# Patient Record
Sex: FEMALE | Race: BLACK OR AFRICAN AMERICAN | NOT HISPANIC OR LATINO | URBAN - METROPOLITAN AREA
[De-identification: names, ages, dates, MRNs, and addresses within clinical notes are randomized per-mention and may not be internally consistent; named-entity substitution may affect disease eponyms.]

---

## 2024-05-03 ENCOUNTER — INPATIENT (INPATIENT)
Facility: HOSPITAL | Age: 49
LOS: 1 days | Discharge: HOME | End: 2024-05-05
Attending: INTERNAL MEDICINE | Admitting: INTERNAL MEDICINE
Payer: COMMERCIAL

## 2024-05-03 ENCOUNTER — RESULT REVIEW (OUTPATIENT)
Age: 49
End: 2024-05-03

## 2024-05-03 VITALS
HEIGHT: 59 IN | DIASTOLIC BLOOD PRESSURE: 80 MMHG | OXYGEN SATURATION: 97 % | HEART RATE: 96 BPM | RESPIRATION RATE: 19 BRPM | WEIGHT: 248.9 LBS | TEMPERATURE: 98 F | SYSTOLIC BLOOD PRESSURE: 172 MMHG

## 2024-05-03 DIAGNOSIS — J45.901 UNSPECIFIED ASTHMA WITH (ACUTE) EXACERBATION: ICD-10-CM

## 2024-05-03 DIAGNOSIS — E66.01 MORBID (SEVERE) OBESITY DUE TO EXCESS CALORIES: ICD-10-CM

## 2024-05-03 DIAGNOSIS — R79.89 OTHER SPECIFIED ABNORMAL FINDINGS OF BLOOD CHEMISTRY: ICD-10-CM

## 2024-05-03 DIAGNOSIS — R03.0 ELEVATED BLOOD-PRESSURE READING, WITHOUT DIAGNOSIS OF HYPERTENSION: ICD-10-CM

## 2024-05-03 LAB
ALBUMIN SERPL ELPH-MCNC: 3.4 G/DL — SIGNIFICANT CHANGE UP (ref 3.3–5)
ALP SERPL-CCNC: 107 U/L — SIGNIFICANT CHANGE UP (ref 40–120)
ALT FLD-CCNC: 22 U/L — SIGNIFICANT CHANGE UP (ref 12–78)
ANION GAP SERPL CALC-SCNC: 5 MMOL/L — SIGNIFICANT CHANGE UP (ref 5–17)
APTT BLD: 30.3 SEC — SIGNIFICANT CHANGE UP (ref 24.5–35.6)
AST SERPL-CCNC: 31 U/L — SIGNIFICANT CHANGE UP (ref 15–37)
BASOPHILS # BLD AUTO: 0.05 K/UL — SIGNIFICANT CHANGE UP (ref 0–0.2)
BASOPHILS NFR BLD AUTO: 0.5 % — SIGNIFICANT CHANGE UP (ref 0–2)
BILIRUB SERPL-MCNC: 0.6 MG/DL — SIGNIFICANT CHANGE UP (ref 0.2–1.2)
BUN SERPL-MCNC: 8 MG/DL — SIGNIFICANT CHANGE UP (ref 7–23)
CALCIUM SERPL-MCNC: 9 MG/DL — SIGNIFICANT CHANGE UP (ref 8.5–10.1)
CHLORIDE SERPL-SCNC: 106 MMOL/L — SIGNIFICANT CHANGE UP (ref 96–108)
CK MB BLD-MCNC: <1.5 % — SIGNIFICANT CHANGE UP (ref 0–3.5)
CK MB CFR SERPL CALC: <1 NG/ML — SIGNIFICANT CHANGE UP (ref 0.5–3.6)
CK SERPL-CCNC: 65 U/L — SIGNIFICANT CHANGE UP (ref 26–192)
CO2 SERPL-SCNC: 27 MMOL/L — SIGNIFICANT CHANGE UP (ref 22–31)
CREAT SERPL-MCNC: 0.67 MG/DL — SIGNIFICANT CHANGE UP (ref 0.5–1.3)
EGFR: 106 ML/MIN/1.73M2 — SIGNIFICANT CHANGE UP
EOSINOPHIL # BLD AUTO: 0.79 K/UL — HIGH (ref 0–0.5)
EOSINOPHIL NFR BLD AUTO: 7.4 % — HIGH (ref 0–6)
FLUAV AG NPH QL: SIGNIFICANT CHANGE UP
FLUBV AG NPH QL: SIGNIFICANT CHANGE UP
GLUCOSE SERPL-MCNC: 111 MG/DL — HIGH (ref 70–99)
HCG SERPL-ACNC: <1 MIU/ML — SIGNIFICANT CHANGE UP
HCT VFR BLD CALC: 39.1 % — SIGNIFICANT CHANGE UP (ref 34.5–45)
HGB BLD-MCNC: 12.3 G/DL — SIGNIFICANT CHANGE UP (ref 11.5–15.5)
IMM GRANULOCYTES NFR BLD AUTO: 0.3 % — SIGNIFICANT CHANGE UP (ref 0–0.9)
INR BLD: 0.96 RATIO — SIGNIFICANT CHANGE UP (ref 0.85–1.18)
LYMPHOCYTES # BLD AUTO: 1.55 K/UL — SIGNIFICANT CHANGE UP (ref 1–3.3)
LYMPHOCYTES # BLD AUTO: 14.5 % — SIGNIFICANT CHANGE UP (ref 13–44)
MCHC RBC-ENTMCNC: 28.5 PG — SIGNIFICANT CHANGE UP (ref 27–34)
MCHC RBC-ENTMCNC: 31.5 G/DL — LOW (ref 32–36)
MCV RBC AUTO: 90.5 FL — SIGNIFICANT CHANGE UP (ref 80–100)
MONOCYTES # BLD AUTO: 0.62 K/UL — SIGNIFICANT CHANGE UP (ref 0–0.9)
MONOCYTES NFR BLD AUTO: 5.8 % — SIGNIFICANT CHANGE UP (ref 2–14)
NEUTROPHILS # BLD AUTO: 7.68 K/UL — HIGH (ref 1.8–7.4)
NEUTROPHILS NFR BLD AUTO: 71.5 % — SIGNIFICANT CHANGE UP (ref 43–77)
NRBC # BLD: 0 /100 WBCS — SIGNIFICANT CHANGE UP (ref 0–0)
NT-PROBNP SERPL-SCNC: 92 PG/ML — SIGNIFICANT CHANGE UP (ref 0–125)
PLATELET # BLD AUTO: 260 K/UL — SIGNIFICANT CHANGE UP (ref 150–400)
POTASSIUM SERPL-MCNC: 3.8 MMOL/L — SIGNIFICANT CHANGE UP (ref 3.5–5.3)
POTASSIUM SERPL-SCNC: 3.8 MMOL/L — SIGNIFICANT CHANGE UP (ref 3.5–5.3)
PROT SERPL-MCNC: 8.4 GM/DL — HIGH (ref 6–8.3)
PROTHROM AB SERPL-ACNC: 11.5 SEC — SIGNIFICANT CHANGE UP (ref 9.5–13)
RAPID RVP RESULT: SIGNIFICANT CHANGE UP
RBC # BLD: 4.32 M/UL — SIGNIFICANT CHANGE UP (ref 3.8–5.2)
RBC # FLD: 18.9 % — HIGH (ref 10.3–14.5)
SARS-COV-2 RNA SPEC QL NAA+PROBE: SIGNIFICANT CHANGE UP
SARS-COV-2 RNA SPEC QL NAA+PROBE: SIGNIFICANT CHANGE UP
SODIUM SERPL-SCNC: 138 MMOL/L — SIGNIFICANT CHANGE UP (ref 135–145)
TROPONIN I, HIGH SENSITIVITY RESULT: 58.9 NG/L — HIGH
TROPONIN I, HIGH SENSITIVITY RESULT: 64.7 NG/L — HIGH
WBC # BLD: 10.72 K/UL — HIGH (ref 3.8–10.5)
WBC # FLD AUTO: 10.72 K/UL — HIGH (ref 3.8–10.5)

## 2024-05-03 PROCEDURE — 71045 X-RAY EXAM CHEST 1 VIEW: CPT | Mod: 26

## 2024-05-03 PROCEDURE — 93010 ELECTROCARDIOGRAM REPORT: CPT

## 2024-05-03 PROCEDURE — 93306 TTE W/DOPPLER COMPLETE: CPT | Mod: 26

## 2024-05-03 PROCEDURE — 99222 1ST HOSP IP/OBS MODERATE 55: CPT

## 2024-05-03 PROCEDURE — 99285 EMERGENCY DEPT VISIT HI MDM: CPT

## 2024-05-03 RX ORDER — BUDESONIDE AND FORMOTEROL FUMARATE DIHYDRATE 160; 4.5 UG/1; UG/1
2 AEROSOL RESPIRATORY (INHALATION)
Refills: 0 | Status: DISCONTINUED | OUTPATIENT
Start: 2024-05-03 | End: 2024-05-05

## 2024-05-03 RX ORDER — ENOXAPARIN SODIUM 100 MG/ML
40 INJECTION SUBCUTANEOUS EVERY 12 HOURS
Refills: 0 | Status: DISCONTINUED | OUTPATIENT
Start: 2024-05-03 | End: 2024-05-05

## 2024-05-03 RX ORDER — AMLODIPINE BESYLATE 2.5 MG/1
10 TABLET ORAL DAILY
Refills: 0 | Status: DISCONTINUED | OUTPATIENT
Start: 2024-05-03 | End: 2024-05-05

## 2024-05-03 RX ORDER — LANOLIN ALCOHOL/MO/W.PET/CERES
3 CREAM (GRAM) TOPICAL AT BEDTIME
Refills: 0 | Status: DISCONTINUED | OUTPATIENT
Start: 2024-05-03 | End: 2024-05-05

## 2024-05-03 RX ORDER — ACETAMINOPHEN 500 MG
650 TABLET ORAL EVERY 6 HOURS
Refills: 0 | Status: DISCONTINUED | OUTPATIENT
Start: 2024-05-03 | End: 2024-05-05

## 2024-05-03 RX ORDER — IPRATROPIUM/ALBUTEROL SULFATE 18-103MCG
3 AEROSOL WITH ADAPTER (GRAM) INHALATION ONCE
Refills: 0 | Status: COMPLETED | OUTPATIENT
Start: 2024-05-03 | End: 2024-05-03

## 2024-05-03 RX ORDER — IPRATROPIUM/ALBUTEROL SULFATE 18-103MCG
3 AEROSOL WITH ADAPTER (GRAM) INHALATION EVERY 6 HOURS
Refills: 0 | Status: DISCONTINUED | OUTPATIENT
Start: 2024-05-03 | End: 2024-05-05

## 2024-05-03 RX ORDER — AMLODIPINE BESYLATE 2.5 MG/1
10 TABLET ORAL DAILY
Refills: 0 | Status: DISCONTINUED | OUTPATIENT
Start: 2024-05-03 | End: 2024-05-03

## 2024-05-03 RX ADMIN — AMLODIPINE BESYLATE 10 MILLIGRAM(S): 2.5 TABLET ORAL at 17:05

## 2024-05-03 RX ADMIN — Medication 3 MILLILITER(S): at 11:50

## 2024-05-03 RX ADMIN — Medication 3 MILLILITER(S): at 18:06

## 2024-05-03 RX ADMIN — Medication 3 MILLILITER(S): at 23:31

## 2024-05-03 RX ADMIN — BUDESONIDE AND FORMOTEROL FUMARATE DIHYDRATE 2 PUFF(S): 160; 4.5 AEROSOL RESPIRATORY (INHALATION) at 17:25

## 2024-05-03 RX ADMIN — ENOXAPARIN SODIUM 40 MILLIGRAM(S): 100 INJECTION SUBCUTANEOUS at 19:10

## 2024-05-03 NOTE — ED ADULT NURSE NOTE - OBJECTIVE STATEMENT
Pt BIB EMS from home c/o difficulty breathing x 1 week. Pt reports experiencing symptoms x 3 years and has been using friend's inhaler with reported relief. Pt endorses using inhaler up 3-4 pumps every morning after which she feels better. Pt has never been diagnosed with asthma but reports her mother had a hx of asthma. Per EMS pt received  decadron 10mg x1, 2 duonebs and mag 2grams x1 via 20 guage placed by EMS on top if L hand. Pt reports expereincing some relief. Pt speaking in clear complete sentences, equal rise and fall of chest wall noted.

## 2024-05-03 NOTE — ED PROVIDER NOTE - OBJECTIVE STATEMENT
48 y/o F no pmhx presents w/ sob for past 1 week. worse today per patient. per ems, patient wheezing and only able to speak in few words. was given decadron, 2x duo-nebs, magnesium and IM epi w/ significant improvement. patient states she was never diagnosed w/ asthma but believes that she has asthma, has family hx and has been using friends inhaler intermittently for past 3 years. denies smoking hx. denies chest pain. denies abdominal pain. endorsing cough. denies fever/chills. denies abdominal pain. denies trauma.

## 2024-05-03 NOTE — H&P ADULT - NSHPPHYSICALEXAM_GEN_ALL_CORE
CONSTITUTIONAL: alert and cooperative, no acute distress.   EYES: PERRL, no scleral icterus  ENT: Mucosa moist, tongue normal.  NECK: Neck supple, trachea midline, non-tender  CARDIAC: Normal S1 and S2. Regular rate and rhythms. No Pedal edema  LUNGS: Equal but slightly decreased air entry both lungs. Mild wheezing. Increase respiratory effort.   ABDOMEN: Soft, nondistended, nontender. No guarding or rebound tenderness. No hepatomegaly or splenomegaly. Bowel sound normal  MUSCULOSKELETAL: Normocephalic, atraumatic. No significant deformity or joint abnormality  NEUROLOGICAL: No gross motor or sensory deficits  PSYCHIATRIC: A&O x 3, appropriate mood and affect.

## 2024-05-03 NOTE — PATIENT PROFILE ADULT - STATED REASON FOR ADMISSION
Dr Tevin Desir checked patient over and determined patient can go home and follow up with care provider with scheduled appointment. SOB x1 week

## 2024-05-03 NOTE — H&P ADULT - PROBLEM SELECTOR PLAN 4
hypertensive to systolic 170s upon arrival, now 190s  Start amlodipine 10mg daily  Monitor BP and titrate BP meds

## 2024-05-03 NOTE — ED PROVIDER NOTE - CLINICAL SUMMARY MEDICAL DECISION MAKING FREE TEXT BOX
50 y/o F no pmhx presents w/ sob for past 1 week. worse today per patient. per ems, patient wheezing and only able to speak in few words. was given decadron, 2x duo-nebs, magnesium and IM epi w/ significant improvement. patient states she was never diagnosed w/ asthma but believes that she has asthma, has family hx and has been using friends inhaler intermittently for past 3 years. denies smoking hx. denies chest pain. denies abdominal pain. endorsing cough. denies fever/chills. denies abdominal pain. denies trauma.   diffuse exp wheezing. no accessory muscle use. patient appears significantly improved after EMS intervention. still continued wheezing. will give additional duo-neb. cxr, check labs. consider acute chf as well but less likely.   cardiac monitor, ekg.   likely acute asthma/reactive airway disease.

## 2024-05-03 NOTE — H&P ADULT - HISTORY OF PRESENT ILLNESS
49 years old female with h/o morbid obesity present to ED with complain of severe SOB and wheezing. Patient reported SOB for last 2 week or so, gradually worsened over last week. Patient reported intermittent SOB with wheezing for last 4-5 years. Has not seen PCP since COVID started. Patient was not evaluated by pul as well. For last 2 weeks, patient has been using her friend's inhalers. No fever, chills, nausea, vomiting or diarrhea  Slightly hypertensive, afebrile, sat well at RA. WBC 10.72, plt 260, K 3.8, Cr 0.67. hsTnT 58.9. Flu/COVID negative. EKG with NSR. CXR with no focal consolidation    SH: never smoke, use alcohol socially  FH: asthma, DM, HTN

## 2024-05-03 NOTE — H&P ADULT - PROBLEM SELECTOR PLAN 2
hsTnT 58.9  EKG  ( I personally review) with NSR  No chest pain or exertional chest pain  Likely due to asthma exacerbation and IM epi effect  Serial trop/Ck/CKMB, ECHO, telemetry  lipid panel, A1c hsTnT 58.9  EKG  ( I personally review) with NSR, no acute ST-T changes to suggest ACS  No chest pain or exertional chest pain  Likely due to asthma exacerbation and IM epi effect  Serial trop/Ck/CKMB, ECHO, telemetry  lipid panel, A1c

## 2024-05-03 NOTE — H&P ADULT - PROBLEM SELECTOR PLAN 1
present with severe SOB and wheezing  Received dexamethasone 10mg, duoneb, IV magnesium and IM Epi with EMS  CXR  ( I personally review) with no focal consolidation  Flu/COVID negative  Likely undiagnosed reactive airway disease with acute exacerbation since patient has intermittent flare up symptoms for last few years  Check RVP  Prednisone 40mg daily x 5 days, duoneb q6hr  start symbicort  Patient will need prescription for inhalers upon discharge  Patient will benefit from outpatient pul follow up for PFT

## 2024-05-03 NOTE — H&P ADULT - ASSESSMENT
49 years old female with h/o morbid obesity present to ED with complain of severe SOB and wheezing. Patient reported SOB for last 2 week or so, gradually worsened over last week. Patient reported intermittent SOB with wheezing for last 4-5 years. Has not seen PCP since COVID started. Patient was not evaluated by pul as well. For last 2 weeks, patient has been using her friend's inhalers. No fever, chills, nausea, vomiting or diarrhea  Slightly hypertensive, afebrile, sat well at RA. WBC 10.72, plt 260, K 3.8, Cr 0.67. hsTnT 58.9. Flu/COVID negative. EKG with NSR. CXR with no focal consolidation

## 2024-05-03 NOTE — ED ADULT TRIAGE NOTE - CHIEF COMPLAINT QUOTE
BIBA for difficulty breathing x1 week. No PMH. Received dex 10mg x1, 2 duonebs and mag 2grams x1 from EMS. G20 left hand.

## 2024-05-03 NOTE — ED PROVIDER NOTE - PHYSICAL EXAMINATION
General: Well appearing female in no acute distress  HEENT: Normocephalic, atraumatic. Moist mucous membranes. Oropharynx clear. No lymphadenopathy.  Eyes: No scleral icterus. EOMI. DOUGLAS.  Neck:. Soft and supple. Full ROM without pain. No midline tenderness  Cardiac: Regular rate and regular rhythm. No murmurs, rubs, gallops. Peripheral pulses 2+ and symmetric. No LE edema.  Resp: Lungs CTAB. Speaking in full sentences. +diffuse expiratory wheezing   Abd: Soft, non-tender, non-distended. No guarding or rebound. No scars, masses, or lesions.  Back: Spine midline and non-tender. No CVA tenderness.    Skin: No rashes, abrasions, or lacerations.  Neuro: AO x 3. Moves all extremities symmetrically. Motor strength and sensation grossly intact.

## 2024-05-04 LAB
A1C WITH ESTIMATED AVERAGE GLUCOSE RESULT: 5.5 % — SIGNIFICANT CHANGE UP (ref 4–5.6)
ALBUMIN SERPL ELPH-MCNC: 3 G/DL — LOW (ref 3.3–5)
ALP SERPL-CCNC: 103 U/L — SIGNIFICANT CHANGE UP (ref 40–120)
ALT FLD-CCNC: 16 U/L — SIGNIFICANT CHANGE UP (ref 12–78)
ANION GAP SERPL CALC-SCNC: 6 MMOL/L — SIGNIFICANT CHANGE UP (ref 5–17)
AST SERPL-CCNC: 14 U/L — LOW (ref 15–37)
BILIRUB SERPL-MCNC: 0.1 MG/DL — LOW (ref 0.2–1.2)
BUN SERPL-MCNC: 11 MG/DL — SIGNIFICANT CHANGE UP (ref 7–23)
CALCIUM SERPL-MCNC: 9.4 MG/DL — SIGNIFICANT CHANGE UP (ref 8.5–10.1)
CHLORIDE SERPL-SCNC: 108 MMOL/L — SIGNIFICANT CHANGE UP (ref 96–108)
CHOLEST SERPL-MCNC: 163 MG/DL — SIGNIFICANT CHANGE UP
CK MB BLD-MCNC: <2.2 % — SIGNIFICANT CHANGE UP (ref 0–3.5)
CK MB CFR SERPL CALC: <1 NG/ML — SIGNIFICANT CHANGE UP (ref 0.5–3.6)
CK SERPL-CCNC: 46 U/L — SIGNIFICANT CHANGE UP (ref 26–192)
CO2 SERPL-SCNC: 25 MMOL/L — SIGNIFICANT CHANGE UP (ref 22–31)
CREAT SERPL-MCNC: 0.6 MG/DL — SIGNIFICANT CHANGE UP (ref 0.5–1.3)
EGFR: 109 ML/MIN/1.73M2 — SIGNIFICANT CHANGE UP
ESTIMATED AVERAGE GLUCOSE: 111 MG/DL — SIGNIFICANT CHANGE UP (ref 68–114)
GLUCOSE SERPL-MCNC: 93 MG/DL — SIGNIFICANT CHANGE UP (ref 70–99)
HCT VFR BLD CALC: 34.6 % — SIGNIFICANT CHANGE UP (ref 34.5–45)
HCV AB S/CO SERPL IA: 0.11 S/CO — SIGNIFICANT CHANGE UP (ref 0–0.99)
HCV AB SERPL-IMP: SIGNIFICANT CHANGE UP
HDLC SERPL-MCNC: 60 MG/DL — SIGNIFICANT CHANGE UP
HGB BLD-MCNC: 11.1 G/DL — LOW (ref 11.5–15.5)
LIPID PNL WITH DIRECT LDL SERPL: 93 MG/DL — SIGNIFICANT CHANGE UP
MAGNESIUM SERPL-MCNC: 2.1 MG/DL — SIGNIFICANT CHANGE UP (ref 1.6–2.6)
MCHC RBC-ENTMCNC: 28.6 PG — SIGNIFICANT CHANGE UP (ref 27–34)
MCHC RBC-ENTMCNC: 32.1 G/DL — SIGNIFICANT CHANGE UP (ref 32–36)
MCV RBC AUTO: 89.2 FL — SIGNIFICANT CHANGE UP (ref 80–100)
NON HDL CHOLESTEROL: 103 MG/DL — SIGNIFICANT CHANGE UP
NRBC # BLD: 0 /100 WBCS — SIGNIFICANT CHANGE UP (ref 0–0)
PHOSPHATE SERPL-MCNC: 2.9 MG/DL — SIGNIFICANT CHANGE UP (ref 2.5–4.5)
PLATELET # BLD AUTO: 290 K/UL — SIGNIFICANT CHANGE UP (ref 150–400)
POTASSIUM SERPL-MCNC: 4.1 MMOL/L — SIGNIFICANT CHANGE UP (ref 3.5–5.3)
POTASSIUM SERPL-SCNC: 4.1 MMOL/L — SIGNIFICANT CHANGE UP (ref 3.5–5.3)
PROT SERPL-MCNC: 7.7 GM/DL — SIGNIFICANT CHANGE UP (ref 6–8.3)
RBC # BLD: 3.88 M/UL — SIGNIFICANT CHANGE UP (ref 3.8–5.2)
RBC # FLD: 18.8 % — HIGH (ref 10.3–14.5)
SODIUM SERPL-SCNC: 139 MMOL/L — SIGNIFICANT CHANGE UP (ref 135–145)
TRIGL SERPL-MCNC: 45 MG/DL — SIGNIFICANT CHANGE UP
TROPONIN I, HIGH SENSITIVITY RESULT: 48.5 NG/L — SIGNIFICANT CHANGE UP
WBC # BLD: 16.62 K/UL — HIGH (ref 3.8–10.5)
WBC # FLD AUTO: 16.62 K/UL — HIGH (ref 3.8–10.5)

## 2024-05-04 PROCEDURE — 99233 SBSQ HOSP IP/OBS HIGH 50: CPT

## 2024-05-04 RX ORDER — HYDRALAZINE HCL 50 MG
25 TABLET ORAL THREE TIMES A DAY
Refills: 0 | Status: DISCONTINUED | OUTPATIENT
Start: 2024-05-04 | End: 2024-05-05

## 2024-05-04 RX ORDER — FUROSEMIDE 40 MG
40 TABLET ORAL ONCE
Refills: 0 | Status: COMPLETED | OUTPATIENT
Start: 2024-05-04 | End: 2024-05-04

## 2024-05-04 RX ADMIN — Medication 40 MILLIGRAM(S): at 05:26

## 2024-05-04 RX ADMIN — Medication 3 MILLILITER(S): at 05:36

## 2024-05-04 RX ADMIN — Medication 40 MILLIGRAM(S): at 14:03

## 2024-05-04 RX ADMIN — Medication 3 MILLILITER(S): at 11:12

## 2024-05-04 RX ADMIN — Medication 3 MILLILITER(S): at 23:30

## 2024-05-04 RX ADMIN — Medication 3 MILLILITER(S): at 17:02

## 2024-05-04 RX ADMIN — AMLODIPINE BESYLATE 10 MILLIGRAM(S): 2.5 TABLET ORAL at 05:26

## 2024-05-04 RX ADMIN — Medication 25 MILLIGRAM(S): at 14:02

## 2024-05-04 RX ADMIN — BUDESONIDE AND FORMOTEROL FUMARATE DIHYDRATE 2 PUFF(S): 160; 4.5 AEROSOL RESPIRATORY (INHALATION) at 17:19

## 2024-05-04 RX ADMIN — Medication 25 MILLIGRAM(S): at 21:41

## 2024-05-04 RX ADMIN — BUDESONIDE AND FORMOTEROL FUMARATE DIHYDRATE 2 PUFF(S): 160; 4.5 AEROSOL RESPIRATORY (INHALATION) at 05:28

## 2024-05-04 RX ADMIN — ENOXAPARIN SODIUM 40 MILLIGRAM(S): 100 INJECTION SUBCUTANEOUS at 05:27

## 2024-05-04 RX ADMIN — ENOXAPARIN SODIUM 40 MILLIGRAM(S): 100 INJECTION SUBCUTANEOUS at 17:20

## 2024-05-04 NOTE — PROGRESS NOTE ADULT - TIME BILLING
I have spent a total of 51 minutes to prepare to see the patient, obtaining and reviewing history, physical examination, explaining the diagnosis, prognosis and treatment plan with the patient/family/caregiver. I also have spent the time ordering studies and testing, interpreting results, medicine reconciliation, subspecialty consultation and documentation as above.

## 2024-05-04 NOTE — PROGRESS NOTE ADULT - SUBJECTIVE AND OBJECTIVE BOX
Patient is a 49y old  Female who presents with a chief complaint of acute asthma exacerbation (03 May 2024 15:37)    INTERVAL HPI/OVERNIGHT EVENTS: NAEON     MEDICATIONS  (STANDING):  albuterol/ipratropium for Nebulization 3 milliLiter(s) Nebulizer every 6 hours  amLODIPine   Tablet 10 milliGRAM(s) Oral daily  budesonide 160 MICROgram(s)/formoterol 4.5 MICROgram(s) Inhaler 2 Puff(s) Inhalation two times a day  enoxaparin Injectable 40 milliGRAM(s) SubCutaneous every 12 hours  furosemide   Injectable 40 milliGRAM(s) IV Push once  predniSONE   Tablet 40 milliGRAM(s) Oral daily    MEDICATIONS  (PRN):  acetaminophen     Tablet .. 650 milliGRAM(s) Oral every 6 hours PRN Temp greater or equal to 38C (100.4F), Mild Pain (1 - 3), Moderate Pain (4 - 6)  melatonin 3 milliGRAM(s) Oral at bedtime PRN Insomnia    Allergies    No Known Allergies    Intolerances      REVIEW OF SYSTEMS:  All other systems reviewed and are negative    Vital Signs Last 24 Hrs  T(C): 37.1 (04 May 2024 10:36), Max: 37.1 (04 May 2024 10:36)  T(F): 98.8 (04 May 2024 10:36), Max: 98.8 (04 May 2024 10:36)  HR: 108 (04 May 2024 10:36) (80 - 108)  BP: 157/87 (04 May 2024 10:36) (147/80 - 193/98)  BP(mean): --  RR: 18 (04 May 2024 10:36) (16 - 18)  SpO2: 98% (04 May 2024 10:36) (97% - 98%)    Parameters below as of 04 May 2024 10:36  Patient On (Oxygen Delivery Method): room air      Daily     Daily Weight in k.1 (04 May 2024 04:59)  I&O's Summary    CAPILLARY BLOOD GLUCOSE        PHYSICAL EXAM:  CONSTITUTIONAL: alert and cooperative, no acute distress.   EYES: PERRL, no scleral icterus  ENT: Mucosa moist, tongue normal.  NECK: Neck supple, trachea midline, non-tender  CARDIAC: Normal S1 and S2. Regular rate and rhythms. No Pedal edema  LUNGS: Equal but slightly decreased air entry both lungs. Mild wheezing. Increase respiratory effort.   ABDOMEN: Soft, nondistended, nontender. No guarding or rebound tenderness. No hepatomegaly or splenomegaly. Bowel sound normal  MUSCULOSKELETAL: Normocephalic, atraumatic. No significant deformity or joint abnormality  NEUROLOGICAL: No gross motor or sensory deficits  PSYCHIATRIC: A&O x 3, appropriate mood and affect.    Labs                          11.1   16.62 )-----------( 290      ( 04 May 2024 05:55 )             34.6     05-04    139  |  108  |  11  ----------------------------<  93  4.1   |  25  |  0.60    Ca    9.4      04 May 2024 05:55  Phos  2.9     05-04  Mg     2.1     05-04    TPro  7.7  /  Alb  3.0<L>  /  TBili  0.1<L>  /  DBili  x   /  AST  14<L>  /  ALT  16  /  AlkPhos  103  05-04    PT/INR - ( 03 May 2024 11:38 )   PT: 11.5 sec;   INR: 0.96 ratio         PTT - ( 03 May 2024 11:38 )  PTT:30.3 sec  CARDIAC MARKERS ( 04 May 2024 05:55 )  x     / x     / 46 U/L / x     / <1.0 ng/mL  CARDIAC MARKERS ( 03 May 2024 15:25 )  x     / x     / 65 U/L / x     / <1.0 ng/mL      Urinalysis Basic - ( 04 May 2024 05:55 )    Color: x / Appearance: x / SG: x / pH: x  Gluc: 93 mg/dL / Ketone: x  / Bili: x / Urobili: x   Blood: x / Protein: x / Nitrite: x   Leuk Esterase: x / RBC: x / WBC x   Sq Epi: x / Non Sq Epi: x / Bacteria: x                  DVT prophylaxis: > Lovenox 40mg SQ daily  > Heparin   > SCD's

## 2024-05-04 NOTE — PROGRESS NOTE ADULT - ASSESSMENT
49 years old female with h/o morbid obesity present to ED with complain of severe SOB and wheezing. Patient reported SOB for last 2 week or so, gradually worsened over last week. Admitted for Acute asthma exacerbation.     ##Acute Asthma exacerbation    Patient reported SOB for last 2 week or so, gradually worsened over last week. Patient reported intermittent SOB with wheezing for last 4-5 years. Has not seen PCP since COVID started. Patient was not evaluated by pul as well. For last 2 weeks, patient has been using her friend's inhalers.  EKG with NSR. CXR with no focal consolidation Received dexamethasone 10mg, duoneb, IV magnesium and IM Epi with EMS. CXR reviewed no focal consolidation. RVP negative. Likely undiagnosed reactive airway disease with acute exacerbation since patient has intermittent flare up symptoms for last few years. Symptomatically improved and now on RA.   - C/w Prednisone 40mg daily x 5 days, duoneb q6hr  - C/w symbicort  - OT lasix for congestion   - O/p follow up with Pulm at discharge for PFTs    # Elevated troponin.   ·  Plan: hsTnT 58.9->64.7->48.5. EKG reviewed with NSR, no acute ST-T changes to suggest ACS. No chest pain.  or exertional chest pain. Likely due to asthma exacerbation and IM epi effect. Echo reviewed - Left ventricular ejection fraction, by visual estimation, is 65 to 70%, Normal global left ventricular systolic function. There is mild left ventricular hypertrophy. No wall motion abnormalities.     # Morbid obesity.   ·  BMI 48.3, weight loss and lifestyle modification.    # Elevated BP without diagnosis of hypertension.   · At admission, hypertensive to systolic 170s upon arrival, now 190s  C/w amlodipine 10mg daily  - Start hydralazine 25md TID     Diet: regular   DVT prophylaxis: lovenox  Dispo: Tele   Code Status:

## 2024-05-05 ENCOUNTER — TRANSCRIPTION ENCOUNTER (OUTPATIENT)
Age: 49
End: 2024-05-05

## 2024-05-05 VITALS — OXYGEN SATURATION: 99 %

## 2024-05-05 LAB
ANION GAP SERPL CALC-SCNC: 7 MMOL/L — SIGNIFICANT CHANGE UP (ref 5–17)
BASOPHILS # BLD AUTO: 0.04 K/UL — SIGNIFICANT CHANGE UP (ref 0–0.2)
BASOPHILS NFR BLD AUTO: 0.3 % — SIGNIFICANT CHANGE UP (ref 0–2)
BUN SERPL-MCNC: 18 MG/DL — SIGNIFICANT CHANGE UP (ref 7–23)
CALCIUM SERPL-MCNC: 9 MG/DL — SIGNIFICANT CHANGE UP (ref 8.5–10.1)
CHLORIDE SERPL-SCNC: 106 MMOL/L — SIGNIFICANT CHANGE UP (ref 96–108)
CO2 SERPL-SCNC: 25 MMOL/L — SIGNIFICANT CHANGE UP (ref 22–31)
CREAT SERPL-MCNC: 0.52 MG/DL — SIGNIFICANT CHANGE UP (ref 0.5–1.3)
EGFR: 112 ML/MIN/1.73M2 — SIGNIFICANT CHANGE UP
EOSINOPHIL # BLD AUTO: 0.13 K/UL — SIGNIFICANT CHANGE UP (ref 0–0.5)
EOSINOPHIL NFR BLD AUTO: 0.8 % — SIGNIFICANT CHANGE UP (ref 0–6)
GLUCOSE SERPL-MCNC: 88 MG/DL — SIGNIFICANT CHANGE UP (ref 70–99)
HCT VFR BLD CALC: 33.5 % — LOW (ref 34.5–45)
HGB BLD-MCNC: 10.6 G/DL — LOW (ref 11.5–15.5)
IMM GRANULOCYTES NFR BLD AUTO: 0.5 % — SIGNIFICANT CHANGE UP (ref 0–0.9)
LYMPHOCYTES # BLD AUTO: 13 % — SIGNIFICANT CHANGE UP (ref 13–44)
LYMPHOCYTES # BLD AUTO: 2.01 K/UL — SIGNIFICANT CHANGE UP (ref 1–3.3)
MAGNESIUM SERPL-MCNC: 1.9 MG/DL — SIGNIFICANT CHANGE UP (ref 1.6–2.6)
MCHC RBC-ENTMCNC: 28.2 PG — SIGNIFICANT CHANGE UP (ref 27–34)
MCHC RBC-ENTMCNC: 31.6 G/DL — LOW (ref 32–36)
MCV RBC AUTO: 89.1 FL — SIGNIFICANT CHANGE UP (ref 80–100)
MONOCYTES # BLD AUTO: 0.95 K/UL — HIGH (ref 0–0.9)
MONOCYTES NFR BLD AUTO: 6.2 % — SIGNIFICANT CHANGE UP (ref 2–14)
NEUTROPHILS # BLD AUTO: 12.21 K/UL — HIGH (ref 1.8–7.4)
NEUTROPHILS NFR BLD AUTO: 79.2 % — HIGH (ref 43–77)
NRBC # BLD: 0 /100 WBCS — SIGNIFICANT CHANGE UP (ref 0–0)
PLATELET # BLD AUTO: 261 K/UL — SIGNIFICANT CHANGE UP (ref 150–400)
POTASSIUM SERPL-MCNC: 3.5 MMOL/L — SIGNIFICANT CHANGE UP (ref 3.5–5.3)
POTASSIUM SERPL-SCNC: 3.5 MMOL/L — SIGNIFICANT CHANGE UP (ref 3.5–5.3)
RBC # BLD: 3.76 M/UL — LOW (ref 3.8–5.2)
RBC # FLD: 19.2 % — HIGH (ref 10.3–14.5)
SODIUM SERPL-SCNC: 138 MMOL/L — SIGNIFICANT CHANGE UP (ref 135–145)
WBC # BLD: 15.58 K/UL — HIGH (ref 3.8–10.5)
WBC # FLD AUTO: 15.58 K/UL — HIGH (ref 3.8–10.5)

## 2024-05-05 PROCEDURE — 99239 HOSP IP/OBS DSCHRG MGMT >30: CPT

## 2024-05-05 RX ORDER — AMLODIPINE BESYLATE 2.5 MG/1
1 TABLET ORAL
Qty: 30 | Refills: 3
Start: 2024-05-05 | End: 2024-09-01

## 2024-05-05 RX ORDER — BUDESONIDE AND FORMOTEROL FUMARATE DIHYDRATE 160; 4.5 UG/1; UG/1
2 AEROSOL RESPIRATORY (INHALATION)
Qty: 1 | Refills: 3
Start: 2024-05-05 | End: 2024-09-01

## 2024-05-05 RX ADMIN — ENOXAPARIN SODIUM 40 MILLIGRAM(S): 100 INJECTION SUBCUTANEOUS at 05:34

## 2024-05-05 RX ADMIN — Medication 25 MILLIGRAM(S): at 05:33

## 2024-05-05 RX ADMIN — ENOXAPARIN SODIUM 40 MILLIGRAM(S): 100 INJECTION SUBCUTANEOUS at 17:13

## 2024-05-05 RX ADMIN — Medication 25 MILLIGRAM(S): at 15:20

## 2024-05-05 RX ADMIN — Medication 40 MILLIGRAM(S): at 05:33

## 2024-05-05 RX ADMIN — BUDESONIDE AND FORMOTEROL FUMARATE DIHYDRATE 2 PUFF(S): 160; 4.5 AEROSOL RESPIRATORY (INHALATION) at 05:35

## 2024-05-05 RX ADMIN — AMLODIPINE BESYLATE 10 MILLIGRAM(S): 2.5 TABLET ORAL at 05:32

## 2024-05-05 RX ADMIN — Medication 3 MILLILITER(S): at 11:08

## 2024-05-05 RX ADMIN — BUDESONIDE AND FORMOTEROL FUMARATE DIHYDRATE 2 PUFF(S): 160; 4.5 AEROSOL RESPIRATORY (INHALATION) at 17:14

## 2024-05-05 RX ADMIN — Medication 3 MILLILITER(S): at 17:08

## 2024-05-05 RX ADMIN — Medication 3 MILLILITER(S): at 06:28

## 2024-05-05 NOTE — DISCHARGE NOTE NURSING/CASE MANAGEMENT/SOCIAL WORK - NSDCPEFALRISK_GEN_ALL_CORE
For information on Fall & Injury Prevention, visit: [email or web address] OR  [email or web address] OR  https://www.cdc.gov/steadi/patient.html

## 2024-05-05 NOTE — DISCHARGE NOTE PROVIDER - NSDCFUADDAPPT_GEN_ALL_CORE_FT
It is important to see your primary physician as well as the physicians noted below within the next week to perform a comprehensive medical review.  Call their offices for an appointment as soon as you leave the hospital.  You will also need to see them for renewal of your medications.  If you do not have a primary physician, contact the [facility] Physician Referral Service at [phone].  To obtain your results, you can access the [facility] Patient Portal at [email or web address].  Your medical issues appear to be stable at this time, but if your symptoms recur or worsen, contact your physicians and/or return to the hospital if necessary.  If you encounter any issues or questions with your medication, call your physicians before stopping the medication.    APPTS ARE READY TO BE MADE: [x] YES

## 2024-05-05 NOTE — CHART NOTE - NSCHARTNOTEFT_GEN_A_CORE
Work Letter     To Whom It May Concern,    Mrs. Michelle Lara was admitted to [facility] on May 3rd. She was discharged from the Hospital on May 5th without any activity restrictions. She may return to work on Tuesday May 7th with any further clearance from PCP if required.   Any further questions or concerns please use contact info below.      Angela Pantoja PA-C  Internal Medicine  [address] [place] [id number] [phone]

## 2024-05-05 NOTE — DISCHARGE NOTE PROVIDER - HOSPITAL COURSE
49 years old female with h/o morbid obesity present to ED with complain of severe SOB and wheezing. Patient reported SOB for last 2 week or so, gradually worsened over last week. Patient reported intermittent SOB with wheezing for last 4-5 years. Has not seen PCP since COVID started. Patient was not evaluated by pul as well. For last 2 weeks, patient has been using her friend's inhalers. No fever, chills, nausea, vomiting or diarrhea  Slightly hypertensive, afebrile, sat well at RA. WBC 10.72, plt 260, K 3.8, Cr 0.67. hsTnT 58.9. Flu/COVID negative. EKG with NSR. CXR with no focal consolidation    SH: never smoke, use alcohol socially  FH: asthma, DM, HTN    ##Acute Asthma exacerbation    Patient reported SOB for last 2 week or so, gradually worsened over last week. Patient reported intermittent SOB with wheezing for last 4-5 years. Has not seen PCP since COVID started. Patient was not evaluated by pul as well. For last 2 weeks, patient has been using her friend's inhalers.  EKG with NSR. CXR with no focal consolidation Received dexamethasone 10mg, duoneb, IV magnesium and IM Epi with EMS. CXR reviewed no focal consolidation. RVP negative. Likely undiagnosed reactive airway disease with acute exacerbation since patient has intermittent flare up symptoms for last few years. Symptomatically improved and now on RA. Treated with Prednisone 40mg daily x 5 days, duoneb q6hr. Prescription sent for completion of steroid burst.  Continue symbicort. Referral sent for O/p follow up with Pulm at discharge for PFTs    # Elevated troponin.   ·  Plan: hsTnT 58.9->64.7->48.5. EKG reviewed with NSR, no acute ST-T changes to suggest ACS. No chest pain.  or exertional chest pain. Likely due to asthma exacerbation and IM epi effect. Echo reviewed - Left ventricular ejection fraction, by visual estimation, is 65 to 70%, Normal global left ventricular systolic function. There is mild left ventricular hypertrophy. No wall motion abnormalities.     # Morbid obesity.   ·  BMI 48.3, weight loss and lifestyle modification.    # Elevated BP without diagnosis of hypertension.   · At admission, hypertensive to systolic 170s upon arrival, now 190s. Started on amlodipine. Will need further BP medication titration with PCP.    Patient stable for discharge with close followup with PCP and Pulm.

## 2024-05-05 NOTE — DISCHARGE NOTE PROVIDER - NSDCACTIVITY_GEN_ALL_CORE
Do not drive or operate machinery/Do not make important decisions/No heavy lifting/straining/Activity as tolerated

## 2024-05-05 NOTE — DISCHARGE NOTE PROVIDER - NSDCMRMEDTOKEN_GEN_ALL_CORE_FT
amLODIPine 10 mg oral tablet: 1 tab(s) orally once a day  budesonide-formoterol 160 mcg-4.5 mcg/inh inhalation aerosol: 2 puff(s) inhaled once a day  predniSONE 20 mg oral tablet: 2 tab(s) orally once a day

## 2024-05-05 NOTE — DISCHARGE NOTE PROVIDER - NSDCCPCAREPLAN_GEN_ALL_CORE_FT
PRINCIPAL DISCHARGE DIAGNOSIS  Diagnosis: Acute asthma exacerbation  Assessment and Plan of Treatment: complete steroids  continue symbicort  follow up with pulmonologist      SECONDARY DISCHARGE DIAGNOSES  Diagnosis: Elevated troponin  Assessment and Plan of Treatment:

## 2024-05-05 NOTE — DISCHARGE NOTE PROVIDER - NSFOLLOWUPCLINICS_GEN_ALL_ED_FT
[facility] Pulmonolgy and Sleep Medicine  Pulmonology  [address] [place] [address], [place] [address]  Phone: [phone]  Fax:   Follow Up Time: 1 week

## 2024-05-05 NOTE — DISCHARGE NOTE NURSING/CASE MANAGEMENT/SOCIAL WORK - NSDCFUADDAPPT_GEN_ALL_CORE_FT
It is important to see your primary physician as well as the physicians noted below within the next week to perform a comprehensive medical review.  Call their offices for an appointment as soon as you leave the hospital.  You will also need to see them for renewal of your medications.  If you do not have a primary physician, contact the [facility] Physician Referral Service at [phone].  To obtain your results, you can access the [facility] Patient Portal at [email or web address].  Your medical issues appear to be stable at this time, but if your symptoms recur or worsen, contact your physicians and/or return to the hospital if necessary.  If you encounter any issues or questions with your medication, call your physicians before stopping the medication.    APPTS ARE READY TO BE MADE: [x] YES

## 2024-05-10 DIAGNOSIS — J45.901 UNSPECIFIED ASTHMA WITH (ACUTE) EXACERBATION: ICD-10-CM

## 2024-05-10 DIAGNOSIS — E66.01 MORBID (SEVERE) OBESITY DUE TO EXCESS CALORIES: ICD-10-CM

## 2024-05-10 DIAGNOSIS — R03.0 ELEVATED BLOOD-PRESSURE READING, WITHOUT DIAGNOSIS OF HYPERTENSION: ICD-10-CM

## 2024-05-10 DIAGNOSIS — R79.89 OTHER SPECIFIED ABNORMAL FINDINGS OF BLOOD CHEMISTRY: ICD-10-CM

## 2024-05-13 PROBLEM — Z00.00 ENCOUNTER FOR PREVENTIVE HEALTH EXAMINATION: Status: ACTIVE | Noted: 2024-05-13

## 2024-06-07 ENCOUNTER — APPOINTMENT (OUTPATIENT)
Dept: PULMONOLOGY | Facility: CLINIC | Age: 49
End: 2024-06-07
Payer: COMMERCIAL

## 2024-06-07 ENCOUNTER — LABORATORY RESULT (OUTPATIENT)
Age: 49
End: 2024-06-07

## 2024-06-07 VITALS
HEART RATE: 75 BPM | OXYGEN SATURATION: 99 % | TEMPERATURE: 98 F | WEIGHT: 244 LBS | SYSTOLIC BLOOD PRESSURE: 158 MMHG | DIASTOLIC BLOOD PRESSURE: 79 MMHG | RESPIRATION RATE: 15 BRPM

## 2024-06-07 DIAGNOSIS — J45.909 UNSPECIFIED ASTHMA, UNCOMPLICATED: ICD-10-CM

## 2024-06-07 PROCEDURE — 99203 OFFICE O/P NEW LOW 30 MIN: CPT

## 2024-06-07 RX ORDER — ALBUTEROL SULFATE 90 UG/1
108 (90 BASE) INHALANT RESPIRATORY (INHALATION)
Qty: 1 | Refills: 1 | Status: ACTIVE | COMMUNITY
Start: 2024-06-07 | End: 1900-01-01

## 2024-06-07 NOTE — PHYSICAL EXAM
[No Acute Distress] : no acute distress [Well Nourished] : well nourished [Well Developed] : well developed [Normal Rate/Rhythm] : normal rate/rhythm [Normal S1, S2] : normal s1, s2 [No Resp Distress] : no resp distress [Clear to Auscultation Bilaterally] : clear to auscultation bilaterally [No Edema] : no edema [Oriented x3] : oriented x3 [Normal Affect] : normal affect [TextBox_2] : obesity

## 2024-06-07 NOTE — HISTORY OF PRESENT ILLNESS
[TextBox_4] : 49F who presents to [facility] care s/p admission with an acute asthma exacerbation 5/3-5/5/24. She presented to ED with severe sob and wheezing which she had for 1 week and progressively worsened. She has had worsening shortness of breath and wheezing over the yrs but has never seen a specialist or follows with a pcp regularly.  RVP neg, CXR clear, EKG normal. She was treated with steroids, duoneb and IV magnesium, IM epi with EMS.  She was discharged on prednisone and symbicort. Pt was hypertensive and was started on Amlodipine with recommendation to follow with PCP, which she hasnt schedulde yet. There is family hx of asthma. She does not recall any breathing issues in childhood. She might have seasonal allergies and does have eczema, previously seen by Derm.  She denies any respiratory sx at this time and feel well.  denies other rashes besides eczema, no joint pains, chest pains, cough or sputum.   She is overweight, and was told she breaths "funny" at night. She wakes up at 3am to go to work at 5am. Denies daytime sleepiness.

## 2024-06-10 LAB
BASOPHILS # BLD AUTO: 0.06 K/UL
BASOPHILS NFR BLD AUTO: 0.7 %
EOSINOPHIL # BLD AUTO: 0.51 K/UL
EOSINOPHIL NFR BLD AUTO: 5.6 %
HCT VFR BLD CALC: 36.4 %
HGB BLD-MCNC: 11.3 G/DL
IMM GRANULOCYTES NFR BLD AUTO: 0.2 %
LYMPHOCYTES # BLD AUTO: 1.46 K/UL
LYMPHOCYTES NFR BLD AUTO: 15.9 %
MAN DIFF?: NORMAL
MCHC RBC-ENTMCNC: 28.3 PG
MCHC RBC-ENTMCNC: 31 GM/DL
MCV RBC AUTO: 91.2 FL
MONOCYTES # BLD AUTO: 0.61 K/UL
MONOCYTES NFR BLD AUTO: 6.6 %
NEUTROPHILS # BLD AUTO: 6.52 K/UL
NEUTROPHILS NFR BLD AUTO: 71 %
PLATELET # BLD AUTO: 320 K/UL
RBC # BLD: 3.99 M/UL
RBC # FLD: 18.6 %
WBC # FLD AUTO: 9.18 K/UL

## 2024-06-12 LAB
A ALTERNATA IGE QN: <0.1 KUA/L
A FUMIGATUS IGE QN: 0.14 KUA/L
C ALBICANS IGE QN: 0.78 KUA/L
C HERBARUM IGE QN: 0.18 KUA/L
CAT DANDER IGE QN: <0.1 KUA/L
COMMON RAGWEED IGE QN: 1.17 KUA/L
D FARINAE IGE QN: 12.2 KUA/L
D PTERONYSS IGE QN: 96.1 KUA/L
DEPRECATED A ALTERNATA IGE RAST QL: 0 (ref 0–?)
DEPRECATED A FUMIGATUS IGE RAST QL: NORMAL (ref 0–?)
DEPRECATED C ALBICANS IGE RAST QL: 2
DEPRECATED C HERBARUM IGE RAST QL: NORMAL (ref 0–?)
DEPRECATED CAT DANDER IGE RAST QL: 0 (ref 0–?)
DEPRECATED COMMON RAGWEED IGE RAST QL: 2 (ref 0–?)
DEPRECATED D FARINAE IGE RAST QL: 3 (ref 0–?)
DEPRECATED D PTERONYSS IGE RAST QL: 5 (ref 0–?)
DEPRECATED DOG DANDER IGE RAST QL: 1 (ref 0–?)
DEPRECATED M RACEMOSUS IGE RAST QL: NORMAL
DEPRECATED ROACH IGE RAST QL: 2 (ref 0–?)
DEPRECATED TIMOTHY IGE RAST QL: NORMAL (ref 0–?)
DEPRECATED WHITE OAK IGE RAST QL: NORMAL (ref 0–?)
DOG DANDER IGE QN: 0.37 KUA/L
M RACEMOSUS IGE QN: 0.17 KUA/L
ROACH IGE QN: 1.36 KUA/L
TIMOTHY IGE QN: 0.13 KUA/L
TOTAL IGE SMQN RAST: 3945 KU/L
WHITE OAK IGE QN: 0.18 KUA/L

## 2024-08-12 ENCOUNTER — RX RENEWAL (OUTPATIENT)
Age: 49
End: 2024-08-12

## 2024-08-16 ENCOUNTER — APPOINTMENT (OUTPATIENT)
Dept: PULMONOLOGY | Facility: CLINIC | Age: 49
End: 2024-08-16
Payer: COMMERCIAL

## 2024-08-16 ENCOUNTER — NON-APPOINTMENT (OUTPATIENT)
Age: 49
End: 2024-08-16

## 2024-08-16 VITALS
SYSTOLIC BLOOD PRESSURE: 142 MMHG | DIASTOLIC BLOOD PRESSURE: 82 MMHG | HEART RATE: 78 BPM | HEIGHT: 59 IN | BODY MASS INDEX: 50 KG/M2 | WEIGHT: 248 LBS

## 2024-08-16 DIAGNOSIS — J45.909 UNSPECIFIED ASTHMA, UNCOMPLICATED: ICD-10-CM

## 2024-08-16 PROCEDURE — 94729 DIFFUSING CAPACITY: CPT

## 2024-08-16 PROCEDURE — ZZZZZ: CPT

## 2024-08-16 PROCEDURE — 94726 PLETHYSMOGRAPHY LUNG VOLUMES: CPT

## 2024-08-16 PROCEDURE — 95012 NITRIC OXIDE EXP GAS DETER: CPT

## 2024-08-16 PROCEDURE — 99213 OFFICE O/P EST LOW 20 MIN: CPT | Mod: 25

## 2024-08-16 PROCEDURE — 94060 EVALUATION OF WHEEZING: CPT

## 2024-08-16 RX ORDER — FLUTICASONE PROPIONATE AND SALMETEROL 250; 50 UG/1; UG/1
250-50 POWDER RESPIRATORY (INHALATION)
Qty: 1 | Refills: 3 | Status: ACTIVE | COMMUNITY
Start: 2024-08-16 | End: 1900-01-01

## 2024-08-16 NOTE — HISTORY OF PRESENT ILLNESS
[TextBox_4] : 49F who was initially seen in June s/p admission with an acute asthma exacerbation 5/3-5/5/24. She presented to ED with severe sob and wheezing which she had for 1 week and progressively worsened. She has had worsening shortness of breath and wheezing over the yrs but has never seen a specialist or follows with a pcp regularly. RVP neg, CXR clear, EKG normal. She was treated with steroids, duoneb and IV magnesium, IM epi with EMS. She was discharged on prednisone and symbicort. Pt was hypertensive and was started on Amlodipine with recommendation to follow with PCP, which she hasnt schedulde yet. There is family hx of asthma. She does not recall any breathing issues in childhood. She might have seasonal allergies and does have eczema, previously seen by Derm. She denies any respiratory sx at this time and feel well. denies other rashes besides eczema, no joint pains, chest pains, cough or sputum.  She is overweight, and was told she breaths "funny" at night. She wakes up at 3am to go to work at 5am. Denies daytime sleepiness.  At last visit in June she was given rx for Symbicrt and albuterol. However she didnt receive the symbicort and we were unaware of it. She continues to use albuterol about twice daily with relief. Also referred for HST which hasnt been scheduled yet. Labs positive for allergies to ragweed, dust, dog dander, roaches, mold. IgE and Eos.  Presents today for PFTs

## 2024-08-16 NOTE — END OF VISIT
[FreeTextEntry3] : I reviewed and agree with the information elicited by the advanced care practitioner and I personally elicited a history and examined the patient and developed a plan of care [Time Spent: ___ minutes] : I have spent [unfilled] minutes of time on the encounter.

## 2024-08-16 NOTE — ASSESSMENT
[FreeTextEntry1] : 49F with recent hospital admission in May 2024 for shortness of breath and wheezing. rvp neg, ekg neg, cxr clear. She was treated for an acute asthma exacerbation with steroids and duonebs, discharged on prednisone and symbicort. She feels well since discharge, using albuterol prn. She ran out of Symbicort from the hospital and didnt receive the refill rx we sent. Continues albuterol bid with relief. She gets seasonal allergies and labs shows elevated Eos, IgE and allergy to ragweed, dust, dog dander, and roaches, mold.  Lungs are clear and SpO2 normal. PFTs show moderate airway obstruction and significant response to bronchodilator.   Plan: 1. Repeat cbc with diff today to check Eos 2. Rx Symbicort sent again and will check with pharmacy if covered. Asked pt to call us if she doesnt receive it. 3. Albuterol prn 4. Follow up in 2-3 months. Will discuss sleep study then, HST to be scheduled by pt.

## 2024-08-16 NOTE — PHYSICAL EXAM
[No Acute Distress] : no acute distress [Well Nourished] : well nourished [Well Developed] : well developed [Normal Rate/Rhythm] : normal rate/rhythm [Normal S1, S2] : normal s1, s2 [No Resp Distress] : no resp distress [Clear to Auscultation Bilaterally] : clear to auscultation bilaterally [Oriented x3] : oriented x3 [Normal Affect] : normal affect

## 2024-08-20 LAB
BASOPHILS # BLD AUTO: 0.06 K/UL
BASOPHILS NFR BLD AUTO: 0.7 %
EOSINOPHIL # BLD AUTO: 0.51 K/UL
EOSINOPHIL NFR BLD AUTO: 5.7 %
HCT VFR BLD CALC: 34 %
HGB BLD-MCNC: 10.8 G/DL
IMM GRANULOCYTES NFR BLD AUTO: 0.3 %
LYMPHOCYTES # BLD AUTO: 1.09 K/UL
LYMPHOCYTES NFR BLD AUTO: 12.2 %
MAN DIFF?: NORMAL
MCHC RBC-ENTMCNC: 30.5 PG
MCHC RBC-ENTMCNC: 31.8 GM/DL
MCV RBC AUTO: 96 FL
MONOCYTES # BLD AUTO: 0.75 K/UL
MONOCYTES NFR BLD AUTO: 8.4 %
NEUTROPHILS # BLD AUTO: 6.47 K/UL
NEUTROPHILS NFR BLD AUTO: 72.7 %
PLATELET # BLD AUTO: 252 K/UL
RBC # BLD: 3.54 M/UL
RBC # FLD: 15.9 %
WBC # FLD AUTO: 8.91 K/UL

## 2024-11-08 ENCOUNTER — APPOINTMENT (OUTPATIENT)
Dept: PULMONOLOGY | Facility: CLINIC | Age: 49
End: 2024-11-08
Payer: COMMERCIAL

## 2024-11-08 VITALS
WEIGHT: 245 LBS | BODY MASS INDEX: 49.39 KG/M2 | RESPIRATION RATE: 18 BRPM | DIASTOLIC BLOOD PRESSURE: 82 MMHG | OXYGEN SATURATION: 100 % | TEMPERATURE: 98 F | HEIGHT: 59 IN | SYSTOLIC BLOOD PRESSURE: 130 MMHG | HEART RATE: 102 BPM

## 2024-11-08 DIAGNOSIS — J45.909 UNSPECIFIED ASTHMA, UNCOMPLICATED: ICD-10-CM

## 2024-11-08 PROCEDURE — 99213 OFFICE O/P EST LOW 20 MIN: CPT

## 2024-11-11 ENCOUNTER — INPATIENT (INPATIENT)
Facility: HOSPITAL | Age: 49
LOS: 0 days | Discharge: HOME | End: 2024-11-12
Attending: INTERNAL MEDICINE | Admitting: INTERNAL MEDICINE
Payer: COMMERCIAL

## 2024-11-11 VITALS
TEMPERATURE: 98 F | HEIGHT: 59 IN | RESPIRATION RATE: 18 BRPM | HEART RATE: 126 BPM | WEIGHT: 244.93 LBS | SYSTOLIC BLOOD PRESSURE: 130 MMHG | DIASTOLIC BLOOD PRESSURE: 69 MMHG | OXYGEN SATURATION: 100 %

## 2024-11-11 DIAGNOSIS — D62 ACUTE POSTHEMORRHAGIC ANEMIA: ICD-10-CM

## 2024-11-11 DIAGNOSIS — N84.0 POLYP OF CORPUS UTERI: ICD-10-CM

## 2024-11-11 DIAGNOSIS — I10 ESSENTIAL (PRIMARY) HYPERTENSION: ICD-10-CM

## 2024-11-11 DIAGNOSIS — J45.909 UNSPECIFIED ASTHMA, UNCOMPLICATED: ICD-10-CM

## 2024-11-11 LAB
ALBUMIN SERPL ELPH-MCNC: 3.1 G/DL — LOW (ref 3.3–5)
ALP SERPL-CCNC: 77 U/L — SIGNIFICANT CHANGE UP (ref 40–120)
ALT FLD-CCNC: 19 U/L — SIGNIFICANT CHANGE UP (ref 12–78)
ANION GAP SERPL CALC-SCNC: 5 MMOL/L — SIGNIFICANT CHANGE UP (ref 5–17)
ANISOCYTOSIS BLD QL: SIGNIFICANT CHANGE UP
APTT BLD: 32.4 SEC — SIGNIFICANT CHANGE UP (ref 24.5–35.6)
AST SERPL-CCNC: 13 U/L — LOW (ref 15–37)
BASOPHILS # BLD AUTO: 0.03 K/UL — SIGNIFICANT CHANGE UP (ref 0–0.2)
BASOPHILS NFR BLD AUTO: 0.3 % — SIGNIFICANT CHANGE UP (ref 0–2)
BILIRUB SERPL-MCNC: 0.1 MG/DL — LOW (ref 0.2–1.2)
BLD GP AB SCN SERPL QL: SIGNIFICANT CHANGE UP
BUN SERPL-MCNC: 9 MG/DL — SIGNIFICANT CHANGE UP (ref 7–23)
CALCIUM SERPL-MCNC: 8.2 MG/DL — LOW (ref 8.5–10.1)
CHLORIDE SERPL-SCNC: 111 MMOL/L — HIGH (ref 96–108)
CO2 SERPL-SCNC: 25 MMOL/L — SIGNIFICANT CHANGE UP (ref 22–31)
CREAT SERPL-MCNC: 0.57 MG/DL — SIGNIFICANT CHANGE UP (ref 0.5–1.3)
DACRYOCYTES BLD QL SMEAR: SLIGHT — SIGNIFICANT CHANGE UP
EGFR: 110 ML/MIN/1.73M2 — SIGNIFICANT CHANGE UP
EOSINOPHIL # BLD AUTO: 0.38 K/UL — SIGNIFICANT CHANGE UP (ref 0–0.5)
EOSINOPHIL NFR BLD AUTO: 3.9 % — SIGNIFICANT CHANGE UP (ref 0–6)
GLUCOSE SERPL-MCNC: 96 MG/DL — SIGNIFICANT CHANGE UP (ref 70–99)
HCT VFR BLD CALC: 17.2 % — CRITICAL LOW (ref 34.5–45)
HGB BLD-MCNC: 5.2 G/DL — CRITICAL LOW (ref 11.5–15.5)
HYPOCHROMIA BLD QL: SLIGHT — SIGNIFICANT CHANGE UP
IMM GRANULOCYTES NFR BLD AUTO: 0.5 % — SIGNIFICANT CHANGE UP (ref 0–0.9)
INR BLD: 1.04 RATIO — SIGNIFICANT CHANGE UP (ref 0.85–1.16)
LYMPHOCYTES # BLD AUTO: 1.44 K/UL — SIGNIFICANT CHANGE UP (ref 1–3.3)
LYMPHOCYTES # BLD AUTO: 14.6 % — SIGNIFICANT CHANGE UP (ref 13–44)
MACROCYTES BLD QL: SLIGHT — SIGNIFICANT CHANGE UP
MANUAL SMEAR VERIFICATION: SIGNIFICANT CHANGE UP
MCHC RBC-ENTMCNC: 29.9 PG — SIGNIFICANT CHANGE UP (ref 27–34)
MCHC RBC-ENTMCNC: 30.2 G/DL — LOW (ref 32–36)
MCV RBC AUTO: 98.9 FL — SIGNIFICANT CHANGE UP (ref 80–100)
MICROCYTES BLD QL: SIGNIFICANT CHANGE UP
MONOCYTES # BLD AUTO: 0.77 K/UL — SIGNIFICANT CHANGE UP (ref 0–0.9)
MONOCYTES NFR BLD AUTO: 7.8 % — SIGNIFICANT CHANGE UP (ref 2–14)
NEUTROPHILS # BLD AUTO: 7.19 K/UL — SIGNIFICANT CHANGE UP (ref 1.8–7.4)
NEUTROPHILS NFR BLD AUTO: 72.9 % — SIGNIFICANT CHANGE UP (ref 43–77)
NRBC # BLD: 0 /100 WBCS — SIGNIFICANT CHANGE UP (ref 0–0)
OVALOCYTES BLD QL SMEAR: SLIGHT — SIGNIFICANT CHANGE UP
PLAT MORPH BLD: NORMAL — SIGNIFICANT CHANGE UP
PLATELET # BLD AUTO: 435 K/UL — HIGH (ref 150–400)
PLATELET COUNT - ESTIMATE: NORMAL — SIGNIFICANT CHANGE UP
POIKILOCYTOSIS BLD QL AUTO: SLIGHT — SIGNIFICANT CHANGE UP
POTASSIUM SERPL-MCNC: 3.4 MMOL/L — LOW (ref 3.5–5.3)
POTASSIUM SERPL-SCNC: 3.4 MMOL/L — LOW (ref 3.5–5.3)
PROT SERPL-MCNC: 6.7 GM/DL — SIGNIFICANT CHANGE UP (ref 6–8.3)
PROTHROM AB SERPL-ACNC: 12.1 SEC — SIGNIFICANT CHANGE UP (ref 9.9–13.4)
RBC # BLD: 1.74 M/UL — LOW (ref 3.8–5.2)
RBC # FLD: 15.8 % — HIGH (ref 10.3–14.5)
RBC BLD AUTO: SIGNIFICANT CHANGE UP
SCHISTOCYTES BLD QL AUTO: SLIGHT — SIGNIFICANT CHANGE UP
SODIUM SERPL-SCNC: 141 MMOL/L — SIGNIFICANT CHANGE UP (ref 135–145)
WBC # BLD: 9.86 K/UL — SIGNIFICANT CHANGE UP (ref 3.8–10.5)
WBC # FLD AUTO: 9.86 K/UL — SIGNIFICANT CHANGE UP (ref 3.8–10.5)

## 2024-11-11 PROCEDURE — 99285 EMERGENCY DEPT VISIT HI MDM: CPT

## 2024-11-11 PROCEDURE — 93010 ELECTROCARDIOGRAM REPORT: CPT

## 2024-11-11 PROCEDURE — 99222 1ST HOSP IP/OBS MODERATE 55: CPT

## 2024-11-11 PROCEDURE — 71045 X-RAY EXAM CHEST 1 VIEW: CPT | Mod: 26

## 2024-11-11 PROCEDURE — 76830 TRANSVAGINAL US NON-OB: CPT | Mod: 26

## 2024-11-11 RX ORDER — FLUTICASONE PROPIONATE AND SALMETEROL XINAFOATE 230; 21 UG/1; UG/1
1 AEROSOL, METERED RESPIRATORY (INHALATION)
Refills: 0 | Status: DISCONTINUED | OUTPATIENT
Start: 2024-11-11 | End: 2024-11-12

## 2024-11-11 RX ORDER — MAGNESIUM, ALUMINUM HYDROXIDE 200-200 MG
30 TABLET,CHEWABLE ORAL EVERY 4 HOURS
Refills: 0 | Status: DISCONTINUED | OUTPATIENT
Start: 2024-11-11 | End: 2024-11-12

## 2024-11-11 RX ORDER — ACETAMINOPHEN 500 MG
650 TABLET ORAL EVERY 6 HOURS
Refills: 0 | Status: DISCONTINUED | OUTPATIENT
Start: 2024-11-11 | End: 2024-11-12

## 2024-11-11 RX ORDER — AMLODIPINE BESYLATE 10 MG
10 TABLET ORAL DAILY
Refills: 0 | Status: DISCONTINUED | OUTPATIENT
Start: 2024-11-11 | End: 2024-11-12

## 2024-11-11 RX ORDER — MEDROXYPROGESTERONE ACETATE 10 MG
10 TABLET ORAL
Refills: 0 | Status: DISCONTINUED | OUTPATIENT
Start: 2024-11-11 | End: 2024-11-12

## 2024-11-11 RX ORDER — MELATONIN 5 MG
3 TABLET ORAL AT BEDTIME
Refills: 0 | Status: DISCONTINUED | OUTPATIENT
Start: 2024-11-11 | End: 2024-11-12

## 2024-11-11 RX ORDER — ONDANSETRON HYDROCHLORIDE 2 MG/ML
4 INJECTION, SOLUTION INTRAMUSCULAR; INTRAVENOUS EVERY 8 HOURS
Refills: 0 | Status: DISCONTINUED | OUTPATIENT
Start: 2024-11-11 | End: 2024-11-12

## 2024-11-11 NOTE — ED PROVIDER NOTE - OBJECTIVE STATEMENT
48 y/o female with asthma, anemia, htn  here with low hgb. Pt states she went to iron infusion today and was told her hgb was 5. Pt was told come to the ed for blood transfusion. Pt reports having dyspnea and chest  tightness at night. Pt reports having her menses for the past month using about 1 pad every hour. Denies abdominal pain. Pt states she is schedule to see gyn doctor tomorrow. Denies history fibroids.

## 2024-11-11 NOTE — H&P ADULT - NSHPPHYSICALEXAM_GEN_ALL_CORE
GENERAL: NAD  HEAD:  Atraumatic, normocephalic  EYES: EOMI, PERRL  NECK: Supple, trachea midline, no JVD  HEART: tachycardic , regular rhythm  LUNGS: Unlabored respirations.  Clear to auscultation bilaterally, no crackles, wheezing, or rhonchi  ABDOMEN: Soft, nontender, nondistended, +BS  EXTREMITIES: 2+ peripheral pulses bilaterally. No clubbing, cyanosis, or edema  NERVOUS SYSTEM:  A&Ox3, moving all extremities, no focal deficits

## 2024-11-11 NOTE — H&P ADULT - HISTORY OF PRESENT ILLNESS
49-year-old female with past medical history of morbid obesity, asthma, hypertension, iron deficiency anemia with history of IV iron infusion, who was sent to the ED by outpatient physician  for hemoglobin of 5. the patient reports heavy bleeding for the last two months, changing pads every hour. She also reports some lightheadedness.   On presentation, the patient was tachycardic heart rate 126. Hemoglobin 5.2, MCV 98, platelet 435, potassium 3.4. Transvaginal ultrasound revealed a 7 mm endometrial polyp. Radiology suggests hysterosonography or MRI of pelvis for further evaluation. The patient received 2 unit pack red blood cells in the ED.  OBGYN consulted by ED  recommends medicine admission

## 2024-11-11 NOTE — ED ADULT NURSE NOTE - ED STAT RN HANDOFF DETAILS
report given to CHERI Lorenzo. pt aaox4, resting in stretcher on RA. NADN att. VSS, respirations equal and unlabored. safety measures in place. IV patent and intact.

## 2024-11-11 NOTE — H&P ADULT - PROBLEM SELECTOR PLAN 1
- Hbg 5.2   - sinus tachycardia  - heavy bleeding for last 2 months  - Transvaginal ultrasound revealed a 7 mm endometrial polyp.  -  Radiology suggests hysterosonography or MRI of pelvis for further evaluation.  - OBGYN was consulted in ED; recommends medicine admission, f/u on recommendations  - 2u PRBCs ordered in ED; f/u cbc once completed  - CBC q8, transfuse <7

## 2024-11-11 NOTE — H&P ADULT - ASSESSMENT
49-year-old female with past medical history of morbid obesity, asthma, hypertension, iron deficiency anemia with history of IV iron infusion, who was sent to the ED by outpatient physician  for hemoglobin of 5. the patient reports heavy bleeding for the last two months, changing pads every hour. She also reports some lightheadedness. Hbg 5.2 . Transvaginal ultrasound revealed a 7 mm endometrial polyp. Radiology suggests hysterosonography or MRI of pelvis for further evaluation. The patient received 2 unit pack red blood cells in the ED. OBGYN recommends medicine admission

## 2024-11-11 NOTE — ED PROVIDER NOTE - ATTENDING APP SHARED VISIT CONTRIBUTION OF CARE
Attending note (Marshal): 49-year-old female history of asthma anemia HTN presenting with fatigue shortness of breath associated with exertion and found to have low hemoglobin on outpatient labs.  Patient not hypotensive here hemodynamically stable but still having vaginal bleeding.  Concern for dysfunctional vaginal bleeding versus pregnancy complications labs and pelvic ultrasound obtained.  Hemoglobin 5.2 other labs nonactionable hCG negative.  Ultrasound with endometrial fluid and possible polyp versus abnormal tissue.  Case discussed with OB/GYN Dr. Squires and transfusion initiated in ED.  Given still with active bleeding hemoglobin 5.2 intermittently mildly tachycardic likely symptomatic anemia from vaginal bleeding/dysfunctional uterine bleeding: Planning to continue transfusions admit for further monitoring OB/GYN following as consultant.

## 2024-11-11 NOTE — ED PROVIDER NOTE - PHYSICAL EXAMINATION
GEN: Awake, alert, interactive, NAD.  HEAD AND NECK: NC/AT. Airway patent. Neck supple.   EYES:  Clear b/l.   ENT: Moist mucus membranes.   CARDIAC: Regular rate, regular rhythm. No evident pedal edema.    RESP/CHEST: Normal respiratory effort with no use of accessory muscles or retractions. Clear throughout on auscultation.  ABD: soft, non-distended, non-tender. No rebound, no guarding.   BACK: No midline spinal TTP. No CVAT.   EXTREMITIES: Moving all extremities with no apparent deformities.   SKIN: Warm, dry, intact normal color. No rash.   NEURO: AOx3, CN II-XII grossly intact, no focal deficits.   PSYCH: Appropriate mood and affect. GEN: Awake, alert, interactive, NAD.  HEAD AND NECK: NC/AT. Airway patent. Neck supple.   EYES:  Clear b/l.   ENT: Moist mucus membranes.   CARDIAC: Regular rate, regular rhythm. No evident pedal edema.    RESP/CHEST: Normal respiratory effort with no use of accessory muscles or retractions. Clear throughout on auscultation.  ABD: soft, non-distended, non-tender. No rebound, no guarding.   GYN: chaperoned nurse Natasha (+) moderate blood in the vault.   BACK: No midline spinal TTP. No CVAT.   EXTREMITIES: Moving all extremities with no apparent deformities.   SKIN: Warm, dry, intact normal color. No rash.   NEURO: AOx3, CN II-XII grossly intact, no focal deficits.   PSYCH: Appropriate mood and affect.

## 2024-11-11 NOTE — H&P ADULT - PROBLEM SELECTOR PLAN 4
home meds: Symbicort >. reordered as budesonide/salmeterol  -stable home meds: Symbicort >> reordered as budesonide/salmeterol  -stable

## 2024-11-11 NOTE — H&P ADULT - NSHPLABSRESULTS_GEN_ALL_CORE
LABS:  cret                        5.2    9.86  )-----------( 435      ( 11 Nov 2024 18:20 )             17.2     11-11    141  |  111[H]  |  9   ----------------------------<  96  3.4[L]   |  25  |  0.57    Ca    8.2[L]      11 Nov 2024 18:20    TPro  6.7  /  Alb  3.1[L]  /  TBili  0.1[L]  /  DBili  x   /  AST  13[L]  /  ALT  19  /  AlkPhos  77  11-11    PT/INR - ( 11 Nov 2024 18:20 )   PT: 12.1 sec;   INR: 1.04 ratio         PTT - ( 11 Nov 2024 18:20 )  PTT:32.4 sec    < from: US Transvaginal (11.11.24 @ 20:25) >      IMPRESSION:  Question of 7 mm endometrial polyp. Recommend further evaluation with   hysterosonography or MRI imaging of the pelvis    --- End of Report ---    < end of copied text >

## 2024-11-11 NOTE — ED ADULT NURSE NOTE - NS ED NURSE RECORD ANOTHER HT AND WT
Head, normocephalic, atraumatic, Face, Face within normal limits, Ears, External ears within normal limits, Nose/Nasopharynx, External nose  normal appearance, nares patent, no nasal discharge, Mouth and Throat, Oral cavity appearance normal, Breath odor normal, Lips, Appearance normal
Yes

## 2024-11-11 NOTE — ED PROVIDER NOTE - CLINICAL SUMMARY MEDICAL DECISION MAKING FREE TEXT BOX
50 y/o female with anemia, asthma here with low hgb today. Currently having vaginal bleeding. Vs stable.   Symptoms likely due to symptomatic anemia from vaginal bleeding.   Will obtain basic labs and transfuse if needed and transvaginal US ordered. 48 y/o female with anemia, asthma here with low hgb today. Currently having vaginal bleeding. Vs stable.   Symptoms likely due to symptomatic anemia from vaginal bleeding.   Will obtain basic labs and transfuse if needed and transvaginal US ordered.    labs reviewed hgb 5.2 will transfuse 2 units. 50 y/o female with anemia, asthma here with low hgb today. Currently having vaginal bleeding. Vs stable.   Symptoms likely due to symptomatic anemia from vaginal bleeding.   Will obtain basic labs and transfuse if needed and transvaginal US ordered.    labs reviewed hgb 5.2 will transfuse 2 units.  Gyn Dr Squires called and discussed the case recommend pt be admitted to the hospital and gyn will consult. 50 y/o female with anemia, asthma here with low hgb today. Currently having vaginal bleeding. Vs stable.   Symptoms likely due to symptomatic anemia from vaginal bleeding.   Will obtain basic labs and transfuse if needed and transvaginal US ordered.    labs reviewed hgb 5.2 will transfuse 2 units. HCG negative.   Gyn Dr Squires called and discussed the case recommend pt be admitted to the hospital and gyn will consult. 50 y/o female with anemia, asthma here with low hgb today. Currently having vaginal bleeding. Vs stable.   Symptoms likely due to symptomatic anemia from vaginal bleeding.   Will obtain basic labs and transfuse if needed and transvaginal US ordered.    labs reviewed hgb 5.2 will transfuse 2 units. HCG negative.   Gyn Dr Squires called and discussed the case recommend pt be admitted to the hospital and gyn will consult.      Attending note (Marshal): agree with above; please see my attending statement below for additional details regarding my medical decision making.

## 2024-11-11 NOTE — ED PROVIDER NOTE - TOBACCO USE
Problem: Infection, Risk/Actual (Adult)  Goal: Identify Related Risk Factors and Signs and Symptoms  Outcome: Ongoing (interventions implemented as appropriate)         Never smoker

## 2024-11-11 NOTE — ED PROVIDER NOTE - CARE PLAN
Principal Discharge DX:	Anemia   1 Principal Discharge DX:	Anemia  Secondary Diagnosis:	Dysfunctional uterine bleeding

## 2024-11-11 NOTE — ED ADULT TRIAGE NOTE - CHIEF COMPLAINT QUOTE
was sent by hematologist for hemoglobin of 5, blood drawn done today, heavy bleeding since end of sept, shortness of breath  hx of iron infusions

## 2024-11-11 NOTE — ED ADULT NURSE NOTE - OBJECTIVE STATEMENT
patient alert and oriented x4, came in for abnormal lab result. pt states for the past 1x month she's been having heavy vaginal bleeding, pt got iron infusion today and was told hemoglobin was 5. pmh of iron infusion. pt denies weakness, nausea, vomiting, SOB, chest pain. pt OOB independent with steady gait. pt in no acute respiratory distress or discomfort at this time. fall precautions maintained. safety precautions maintained. pt placed on portable monitor and pulse ox. IV placed.

## 2024-11-12 ENCOUNTER — TRANSCRIPTION ENCOUNTER (OUTPATIENT)
Age: 49
End: 2024-11-12

## 2024-11-12 VITALS
SYSTOLIC BLOOD PRESSURE: 142 MMHG | HEART RATE: 87 BPM | RESPIRATION RATE: 22 BRPM | OXYGEN SATURATION: 96 % | DIASTOLIC BLOOD PRESSURE: 88 MMHG | TEMPERATURE: 98 F

## 2024-11-12 LAB
ALBUMIN SERPL ELPH-MCNC: 2.9 G/DL — LOW (ref 3.3–5)
ALP SERPL-CCNC: 79 U/L — SIGNIFICANT CHANGE UP (ref 40–120)
ALT FLD-CCNC: 18 U/L — SIGNIFICANT CHANGE UP (ref 12–78)
ANION GAP SERPL CALC-SCNC: 4 MMOL/L — LOW (ref 5–17)
AST SERPL-CCNC: 13 U/L — LOW (ref 15–37)
BASOPHILS # BLD AUTO: 0.07 K/UL — SIGNIFICANT CHANGE UP (ref 0–0.2)
BASOPHILS NFR BLD AUTO: 0.7 % — SIGNIFICANT CHANGE UP (ref 0–2)
BILIRUB SERPL-MCNC: 0.4 MG/DL — SIGNIFICANT CHANGE UP (ref 0.2–1.2)
BUN SERPL-MCNC: 6 MG/DL — LOW (ref 7–23)
CALCIUM SERPL-MCNC: 8.1 MG/DL — LOW (ref 8.5–10.1)
CHLORIDE SERPL-SCNC: 110 MMOL/L — HIGH (ref 96–108)
CO2 SERPL-SCNC: 25 MMOL/L — SIGNIFICANT CHANGE UP (ref 22–31)
CREAT SERPL-MCNC: 0.51 MG/DL — SIGNIFICANT CHANGE UP (ref 0.5–1.3)
EGFR: 113 ML/MIN/1.73M2 — SIGNIFICANT CHANGE UP
EOSINOPHIL # BLD AUTO: 0.4 K/UL — SIGNIFICANT CHANGE UP (ref 0–0.5)
EOSINOPHIL NFR BLD AUTO: 4.1 % — SIGNIFICANT CHANGE UP (ref 0–6)
GLUCOSE SERPL-MCNC: 94 MG/DL — SIGNIFICANT CHANGE UP (ref 70–99)
HCT VFR BLD CALC: 23.6 % — LOW (ref 34.5–45)
HCT VFR BLD CALC: 23.8 % — LOW (ref 34.5–45)
HGB BLD-MCNC: 7.4 G/DL — LOW (ref 11.5–15.5)
HGB BLD-MCNC: 7.4 G/DL — LOW (ref 11.5–15.5)
IMM GRANULOCYTES NFR BLD AUTO: 0.8 % — SIGNIFICANT CHANGE UP (ref 0–0.9)
LYMPHOCYTES # BLD AUTO: 1.16 K/UL — SIGNIFICANT CHANGE UP (ref 1–3.3)
LYMPHOCYTES # BLD AUTO: 12 % — LOW (ref 13–44)
MCHC RBC-ENTMCNC: 29.8 PG — SIGNIFICANT CHANGE UP (ref 27–34)
MCHC RBC-ENTMCNC: 30.1 PG — SIGNIFICANT CHANGE UP (ref 27–34)
MCHC RBC-ENTMCNC: 31.1 G/DL — LOW (ref 32–36)
MCHC RBC-ENTMCNC: 31.4 G/DL — LOW (ref 32–36)
MCV RBC AUTO: 95.9 FL — SIGNIFICANT CHANGE UP (ref 80–100)
MCV RBC AUTO: 96 FL — SIGNIFICANT CHANGE UP (ref 80–100)
MONOCYTES # BLD AUTO: 0.86 K/UL — SIGNIFICANT CHANGE UP (ref 0–0.9)
MONOCYTES NFR BLD AUTO: 8.9 % — SIGNIFICANT CHANGE UP (ref 2–14)
NEUTROPHILS # BLD AUTO: 7.1 K/UL — SIGNIFICANT CHANGE UP (ref 1.8–7.4)
NEUTROPHILS NFR BLD AUTO: 73.5 % — SIGNIFICANT CHANGE UP (ref 43–77)
NRBC # BLD: 0 /100 WBCS — SIGNIFICANT CHANGE UP (ref 0–0)
NRBC # BLD: 0 /100 WBCS — SIGNIFICANT CHANGE UP (ref 0–0)
PLATELET # BLD AUTO: 405 K/UL — HIGH (ref 150–400)
PLATELET # BLD AUTO: 410 K/UL — HIGH (ref 150–400)
POTASSIUM SERPL-MCNC: 3.4 MMOL/L — LOW (ref 3.5–5.3)
POTASSIUM SERPL-SCNC: 3.4 MMOL/L — LOW (ref 3.5–5.3)
PROT SERPL-MCNC: 6.3 GM/DL — SIGNIFICANT CHANGE UP (ref 6–8.3)
RBC # BLD: 2.46 M/UL — LOW (ref 3.8–5.2)
RBC # BLD: 2.48 M/UL — LOW (ref 3.8–5.2)
RBC # FLD: 15.7 % — HIGH (ref 10.3–14.5)
RBC # FLD: 15.8 % — HIGH (ref 10.3–14.5)
SODIUM SERPL-SCNC: 139 MMOL/L — SIGNIFICANT CHANGE UP (ref 135–145)
WBC # BLD: 11.71 K/UL — HIGH (ref 3.8–10.5)
WBC # BLD: 9.67 K/UL — SIGNIFICANT CHANGE UP (ref 3.8–10.5)
WBC # FLD AUTO: 11.71 K/UL — HIGH (ref 3.8–10.5)
WBC # FLD AUTO: 9.67 K/UL — SIGNIFICANT CHANGE UP (ref 3.8–10.5)

## 2024-11-12 PROCEDURE — 99239 HOSP IP/OBS DSCHRG MGMT >30: CPT

## 2024-11-12 PROCEDURE — 99254 IP/OBS CNSLTJ NEW/EST MOD 60: CPT

## 2024-11-12 RX ORDER — FERROUS SULFATE 325(65) MG
1 TABLET ORAL
Qty: 60 | Refills: 0
Start: 2024-11-12 | End: 2024-12-11

## 2024-11-12 RX ORDER — MEDROXYPROGESTERONE ACETATE 10 MG
1 TABLET ORAL
Qty: 14 | Refills: 0
Start: 2024-11-12 | End: 2024-11-25

## 2024-11-12 RX ORDER — POTASSIUM CHLORIDE 10 MEQ
40 TABLET, EXTENDED RELEASE ORAL ONCE
Refills: 0 | Status: COMPLETED | OUTPATIENT
Start: 2024-11-12 | End: 2024-11-12

## 2024-11-12 RX ORDER — POTASSIUM CHLORIDE 10 MEQ
40 TABLET, EXTENDED RELEASE ORAL ONCE
Refills: 0 | Status: DISCONTINUED | OUTPATIENT
Start: 2024-11-12 | End: 2024-11-12

## 2024-11-12 RX ADMIN — FLUTICASONE PROPIONATE AND SALMETEROL XINAFOATE 1 DOSE(S): 230; 21 AEROSOL, METERED RESPIRATORY (INHALATION) at 11:46

## 2024-11-12 RX ADMIN — Medication 10 MILLIGRAM(S): at 11:46

## 2024-11-12 RX ADMIN — Medication 10 MILLIGRAM(S): at 09:45

## 2024-11-12 RX ADMIN — Medication 10 MILLIGRAM(S): at 02:01

## 2024-11-12 RX ADMIN — Medication 40 MILLIEQUIVALENT(S): at 08:50

## 2024-11-12 NOTE — DISCHARGE NOTE PROVIDER - NSDCCPCAREPLAN_GEN_ALL_CORE_FT
PRINCIPAL DISCHARGE DIAGNOSIS  Diagnosis: Acute blood loss anemia  Assessment and Plan of Treatment: 49-year-old female with a PMHx of morbid obesity, asthma, hypertension, iron deficiency anemia with history of IV iron infusion, who was sent to the ED by outpatient physician for hemoglobin of 5. the patient reports heavy bleeding for the last two months, changing pads every hour. She also reports some lightheadedness.   On imaging- Transvaginal ultrasound revealed a 7 mm endometrial polyp. S/p 2 unit pack red blood cells in the ED. H/h now stable.   Patient was seen by OBGYN who recommend discharging pt on Provera 10mg daily and will add ferous sulfate 325mg BID.   Pt needs to follow up with OB/GYN in 1 week.         SECONDARY DISCHARGE DIAGNOSES  Diagnosis: Dysfunctional uterine bleeding  Assessment and Plan of Treatment:

## 2024-11-12 NOTE — DISCHARGE NOTE PROVIDER - CARE PROVIDER_API CALL
Reese Mcgraw  Obstetrics and Gynecology  [address] [place] [address]  Phone: [phone]  Fax: [phone]  Follow Up Time: 1 week

## 2024-11-12 NOTE — DISCHARGE NOTE PROVIDER - HOSPITAL COURSE
49-year-old female with a PMHx of morbid obesity, asthma, hypertension, iron deficiency anemia with history of IV iron infusion, who was sent to the ED by outpatient physician for hemoglobin of 5. the patient reports heavy bleeding for the last two months, changing pads every hour. She also reports some lightheadedness.   On imaging- Transvaginal ultrasound revealed a 7 mm endometrial polyp. S/p 2 unit pack red blood cells in the ED. H/h now stable.   Patient was seen by OBGYN who recommend discharging pt on Provera 10mg daily and will add ferous sulfate 325mg BID.   Pt needs to follow up with OB/GYN in 1 week.

## 2024-11-12 NOTE — DISCHARGE NOTE NURSING/CASE MANAGEMENT/SOCIAL WORK - FINANCIAL ASSISTANCE
[facility] provides services at a reduced cost to those who are determined to be eligible through [facility]’s financial assistance program. Information regarding [facility]’s financial assistance program can be found by going to [email or web address] or by calling [phone].

## 2024-11-12 NOTE — DISCHARGE NOTE NURSING/CASE MANAGEMENT/SOCIAL WORK - PATIENT PORTAL LINK FT
You can access the FollowMyHealth Patient Portal offered by [facility] by registering at the following website: [email or web address]. By joining [facility]’s FollowMyHealth portal, you will also be able to view your health information using other applications (apps) compatible with our system.

## 2024-11-12 NOTE — PATIENT PROFILE ADULT - HEALTH LITERACY
Patient ambulated approx 50 feet in hallway, begin to feel nauseated, diaphoretic. Assisted to wheelchair by RN, cold compress applied. VSS. Assisted to bed by RN. Symptoms resolved with return to bed. no

## 2024-11-12 NOTE — CONSULT NOTE ADULT - ASSESSMENT
51 y/o female ,  2 abortions 30 years ago, asthma, HTN, anemia, BMI: 49 c/o vaginal bleeding since the end of September and anemia. US shows Question of 7 mm endometrial polyp.     Plan:  Recommend Provera 10mg BID until discharge then once a day until she follows up with GYN in the office  Trend H/H, transfuse prn  Discussed with Dr. Squires

## 2024-11-12 NOTE — CHART NOTE - NSCHARTNOTEFT_GEN_A_CORE
Work Letter     To Whom It May Concern,  Madalyn Lara was admitted on 11/11/24  at [facility] and was discharged from [facility] on 11/12/24.   Patient may return to work with further clearance from PCP if required.   Any further questions or concerns please use contact info below.      Vernon Pantoja PA-C Aized, Imtiaz, MD  Internal Medicine  [address] [place] [address]  [phone]

## 2024-11-12 NOTE — ED ADULT NURSE REASSESSMENT NOTE - NS ED NURSE REASSESS COMMENT FT1
covering for primary RN. pt is AOx3, on cardiac monitor and room air. pt has blood transfusion running as ordered. pt noted to keep bending arm where transfusion is running - thus delaying end infusion time. pt instructed on the importance of keeping arm straight in order for the blood transfusion to continue properly. otherwise, HAYLEY.

## 2024-11-12 NOTE — DISCHARGE NOTE PROVIDER - NSDCMRMEDTOKEN_GEN_ALL_CORE_FT
amLODIPine 10 mg oral tablet: 1 tab(s) orally once a day  budesonide-formoterol 160 mcg-4.5 mcg/inh inhalation aerosol: 2 puff(s) inhaled once a day  ferrous sulfate 325 mg (65 mg elemental iron) oral tablet: 1 tab(s) orally 2 times a day  medroxyPROGESTERone 10 mg oral tablet: 1 tab(s) orally once a day

## 2024-11-12 NOTE — CONSULT NOTE ADULT - SUBJECTIVE AND OBJECTIVE BOX
GYN CONSULT NOTE    Patient is a 49y old  Female who presents with a chief complaint of Acute blood loss anemia (2024 23:07)    HPI:  49-year-old female with past medical history of morbid obesity, asthma, hypertension, iron deficiency anemia with history of IV iron infusion, who was sent to the ED by outpatient physician  for hemoglobin of 5. the patient reports heavy bleeding for the last two months, changing pads every hour. She also reports some lightheadedness.   On presentation, the patient was tachycardic heart rate 126. Hemoglobin 5.2, MCV 98, platelet 435, potassium 3.4. Transvaginal ultrasound revealed a 7 mm endometrial polyp. Radiology suggests hysterosonography or MRI of pelvis for further evaluation. The patient received 2 unit pack red blood cells in the ED.  OBGYN consulted by ED  recommends medicine admission   (2024 23:07)    Patient seen and examined at bedside.   51 y/o female ,  2 abortions 30 years ago, asthma, HTN, anemia, BMI: 49 c/o vaginal bleeding since the end of September. Pt saw her hematologist today for an iron transfusionand h/h was low so sent her to the ER. Pt was using one pad per hour prior to arriving to the ER. Since being in the ER, pt states that vaginal bleeding is less. Has been spotting for the past hour. Denies abdominal/pelvic pain. Also c/o SOB and mild chest pain. Patient denies fever, chills, nausea, vomiting, constipation, diarrhea, melena, hematochezia, dysuria, hematuria, dizziness, cough. Patient denies prior incident.     REVIEW OF SYSTEMS:  CONSTITUTIONAL: No fever, weight loss, or fatigue  EYES: No eye pain, visual disturbances, discharge  ENMT:  No difficulty hearing, tinnitus, vertigo; No sinus or throat pain  NECK: No pain or stiffness  BREASTS: No pain, masses, or nipple discharge  RESPIRATORY: No cough, wheezing, chills or hemoptysis  CARDIOVASCULAR: No palpitations, dizziness, or leg swelling  GASTROINTESTINAL: No abdominal or epigastric pain. No nausea, vomiting, or hematemesis; No diarrhea or constipation. No melena or hematochezia.  GENITOURINARY: No dysuria, frequency, hematuria, or incontinence  NEUROLOGICAL: No headaches, memory loss, loss of strength, numbness, or tremors  SKIN: No itching, burning, rashes, or lesions   LYMPH NODES: No enlarged glands  ENDOCRINE: No heat or cold intolerance; No hair loss  MUSCULOSKELETAL: No joint pain or swelling; No muscle, back, or extremity pain  PSYCHIATRIC: No depression, anxiety, mood swings, or difficulty sleeping  HEME/LYMPH: No easy bruising, or bleeding gums  ALLERY AND IMMUNOLOGIC: No hives or eczema     PAST MEDICAL & SURGICAL HISTORY:  Anemia  Asthma    MEDICATIONS  (STANDING):  amLODIPine   Tablet 10 milliGRAM(s) Oral daily  fluticasone propionate/ salmeterol 100-50 MICROgram(s) Diskus 1 Dose(s) Inhalation <User Schedule>  medroxyPROGESTERone 10 milliGRAM(s) Oral <User Schedule>    MEDICATIONS  (PRN):  acetaminophen     Tablet .. 650 milliGRAM(s) Oral every 6 hours PRN Temp greater or equal to 38C (100.4F), Mild Pain (1 - 3)  aluminum hydroxide/magnesium hydroxide/simethicone Suspension 30 milliLiter(s) Oral every 4 hours PRN Dyspepsia  melatonin 3 milliGRAM(s) Oral at bedtime PRN Insomnia  ondansetron Injectable 4 milliGRAM(s) IV Push every 8 hours PRN Nausea and/or Vomiting    Allergies  No Known Allergies    SOCIAL HISTORY: Social alcohol use. Denies ETOH and illicit drugs.          FAMILY HISTORY:   Asthma (mother)  DM (grandmother)    Vital Signs Last 24 Hrs  T(C): 37.3 (2024 22:27), Max: 37.3 (2024 22:05)  T(F): 99.1 (2024 22:27), Max: 99.2 (2024 22:05)  HR: 113 (2024 22:27) (113 - 126)  BP: 143/72 (2024 22:27) (113/83 - 143/72)  BP(mean): --  RR: 20 (2024 22:27) (18 - 20)  SpO2: 100% (2024 22:27) (100% - 100%)    Parameters below as of 2024 22:27  Patient On (Oxygen Delivery Method): room air    PHYSICAL EXAM:  CONSTITUTIONAL: NAD, well-developed  HEAD: Atraumatic, Normocephalic  EYES: Conjunctiva and sclera clear  ENMT: No tonsillar erythema, exudates, or enlargement; Moist mucous membranes, No lesions  NECK: Supple, No JVD, Normal thyroid  NERVOUS SYSTEM:  Alert & Oriented X3  RESPIRATORY: Clear to auscultation bilaterally; No rales, rhonchi, wheezing  CARDIOVASCULAR: Regular rate and rhythm. S1S2  GASTROINTESTINAL: Nondistended, +BS, soft, nontender, no guarding, no rigidity   MUSCULOSKELETAL: 2+ Peripheral Pulses, No clubbing, cyanosis, or edema     LABS:                        5.2    9.86  )-----------( 435      ( 2024 18:20 )             17.2         141  |  111[H]  |  9   ----------------------------<  96  3.4[L]   |  25  |  0.57    Ca    8.2[L]      2024 18:20    TPro  6.7  /  Alb  3.1[L]  /  TBili  0.1[L]  /  DBili  x   /  AST  13[L]  /  ALT  19  /  AlkPhos  77      PT/INR - ( 2024 18:20 )   PT: 12.1 sec;   INR: 1.04 ratio      PTT - ( 2024 18:20 )  PTT:32.4 sec    Urinalysis Basic - ( 2024 18:20 )    Color: x / Appearance: x / SG: x / pH: x  Gluc: 96 mg/dL / Ketone: x  / Bili: x / Urobili: x   Blood: x / Protein: x / Nitrite: x   Leuk Esterase: x / RBC: x / WBC x   Sq Epi: x / Non Sq Epi: x / Bacteria: x    < from: US Transvaginal (24 @ 20:25) >  FINDINGS:  Uterus: 8.3 cm x 4.9 cm x 4.6 cm. small intramural fibroids are seen  Endometrium: 5 mm. trace endometrial fluid with suggestion of echogenic   focus in the endometrium measuring 7 mm which may represent a polyp.    Right ovary: 2.1 cm x 1.3 cm x 1.4 cm. Within normal limits. Limited   duplex Doppler evaluation is unremarkable  Left ovary: Not identified. No left adnexal mass.    Fluid: None.    IMPRESSION:  Question of 7 mm endometrial polyp. Recommend further evaluation with   hysterosonography or MRI imaging of the pelvis    < end of copied text >

## 2024-11-12 NOTE — PATIENT PROFILE ADULT - FALL HARM RISK - RISK INTERVENTIONS
Assistance OOB with selected safe patient handling equipment/Communicate Fall Risk and Risk Factors to all staff, patient, and family/Reinforce activity limits and safety measures with patient and family/Visual Cue: Yellow wristband/Bed in lowest position, wheels locked, appropriate side rails in place/Call bell, personal items and telephone in reach/Instruct patient to call for assistance before getting out of bed or chair/Non-slip footwear when patient is out of bed/[place] to call system/Physically safe environment - no spills, clutter or unnecessary equipment/Purposeful Proactive Rounding/Room/bathroom lighting operational, light cord in reach

## 2024-11-20 DIAGNOSIS — E66.01 MORBID (SEVERE) OBESITY DUE TO EXCESS CALORIES: ICD-10-CM

## 2024-11-20 DIAGNOSIS — N93.9 ABNORMAL UTERINE AND VAGINAL BLEEDING, UNSPECIFIED: ICD-10-CM

## 2024-11-20 DIAGNOSIS — I10 ESSENTIAL (PRIMARY) HYPERTENSION: ICD-10-CM

## 2024-11-20 DIAGNOSIS — J45.909 UNSPECIFIED ASTHMA, UNCOMPLICATED: ICD-10-CM

## 2024-11-20 DIAGNOSIS — D62 ACUTE POSTHEMORRHAGIC ANEMIA: ICD-10-CM

## 2024-11-20 DIAGNOSIS — N84.0 POLYP OF CORPUS UTERI: ICD-10-CM

## 2024-12-11 ENCOUNTER — RX RENEWAL (OUTPATIENT)
Age: 49
End: 2024-12-11

## 2025-03-20 NOTE — PATIENT PROFILE ADULT - FUNCTIONAL ASSESSMENT - BASIC MOBILITY ASSESSMENT TYPE
Quality 226: Preventive Care And Screening: Tobacco Use: Screening And Cessation Intervention: Patient screened for tobacco use and is an ex/non-smoker Admission